# Patient Record
Sex: MALE | HISPANIC OR LATINO
[De-identification: names, ages, dates, MRNs, and addresses within clinical notes are randomized per-mention and may not be internally consistent; named-entity substitution may affect disease eponyms.]

---

## 2022-09-03 ENCOUNTER — APPOINTMENT (OUTPATIENT)
Dept: INTERNAL MEDICINE | Facility: CLINIC | Age: 57
End: 2022-09-03

## 2022-09-03 ENCOUNTER — NON-APPOINTMENT (OUTPATIENT)
Age: 57
End: 2022-09-03

## 2022-09-03 VITALS
OXYGEN SATURATION: 97 % | RESPIRATION RATE: 18 BRPM | SYSTOLIC BLOOD PRESSURE: 130 MMHG | HEIGHT: 69.69 IN | BODY MASS INDEX: 29.68 KG/M2 | DIASTOLIC BLOOD PRESSURE: 82 MMHG | HEART RATE: 87 BPM | WEIGHT: 205 LBS | TEMPERATURE: 97.8 F

## 2022-09-03 DIAGNOSIS — Z81.8 FAMILY HISTORY OF OTHER MENTAL AND BEHAVIORAL DISORDERS: ICD-10-CM

## 2022-09-03 PROCEDURE — G0444 DEPRESSION SCREEN ANNUAL: CPT

## 2022-09-03 PROCEDURE — 93000 ELECTROCARDIOGRAM COMPLETE: CPT | Mod: 59

## 2022-09-03 PROCEDURE — 99204 OFFICE O/P NEW MOD 45 MIN: CPT | Mod: 25

## 2022-09-03 PROCEDURE — 36415 COLL VENOUS BLD VENIPUNCTURE: CPT

## 2022-09-03 PROCEDURE — 99386 PREV VISIT NEW AGE 40-64: CPT | Mod: 25

## 2022-09-03 NOTE — HISTORY OF PRESENT ILLNESS
[de-identified] : Patient presents complaining of right groin discomfort for the last few months moderate intermittent only associated with lifting that he does at work.  Patient have noticed a bulge in the right side of the groin and umbilical area.  No nausea vomiting constipation diarrhea fever weight changes urinary symptoms

## 2022-09-03 NOTE — HEALTH RISK ASSESSMENT
[0] : 2) Feeling down, depressed, or hopeless: Not at all (0) [PHQ-2 Negative - No further assessment needed] : PHQ-2 Negative - No further assessment needed [[id number]] : 0

## 2022-09-03 NOTE — PHYSICAL EXAM
[No Acute Distress] : no acute distress [Well Nourished] : well nourished [Well Developed] : well developed [Well-Appearing] : well-appearing [Normal Sclera/Conjunctiva] : normal sclera/conjunctiva [PERRL] : pupils equal round and reactive to light [EOMI] : extraocular movements intact [Normal Outer Ear/Nose] : the outer ears and nose were normal in appearance [Normal Oropharynx] : the oropharynx was normal [No JVD] : no jugular venous distention [No Lymphadenopathy] : no lymphadenopathy [Supple] : supple [Thyroid Normal, No Nodules] : the thyroid was normal and there were no nodules present [No Respiratory Distress] : no respiratory distress  [No Accessory Muscle Use] : no accessory muscle use [Clear to Auscultation] : lungs were clear to auscultation bilaterally [Normal Rate] : normal rate  [Regular Rhythm] : with a regular rhythm [Normal S1, S2] : normal S1 and S2 [No Murmur] : no murmur heard [No Carotid Bruits] : no carotid bruits [No Abdominal Bruit] : a ~M bruit was not heard ~T in the abdomen [No Varicosities] : no varicosities [Pedal Pulses Present] : the pedal pulses are present [No Edema] : there was no peripheral edema [No Palpable Aorta] : no palpable aorta [No Extremity Clubbing/Cyanosis] : no extremity clubbing/cyanosis [Soft] : abdomen soft [Non Tender] : non-tender [Non-distended] : non-distended [No Masses] : no abdominal mass palpated [No HSM] : no HSM [Normal Bowel Sounds] : normal bowel sounds [Normal Posterior Cervical Nodes] : no posterior cervical lymphadenopathy [Normal Anterior Cervical Nodes] : no anterior cervical lymphadenopathy [No CVA Tenderness] : no CVA  tenderness [No Spinal Tenderness] : no spinal tenderness [No Joint Swelling] : no joint swelling [Grossly Normal Strength/Tone] : grossly normal strength/tone [No Rash] : no rash [Coordination Grossly Intact] : coordination grossly intact [No Focal Deficits] : no focal deficits [Normal Gait] : normal gait [Deep Tendon Reflexes (DTR)] : deep tendon reflexes were 2+ and symmetric [Normal Affect] : the affect was normal [Normal Insight/Judgement] : insight and judgment were intact [de-identified] : Bulge umbilical area [de-identified] : Fullness left scrotal area

## 2022-09-03 NOTE — ASSESSMENT
[FreeTextEntry1] : Annual physical routine preventative care and immunizations discussed with the patient healthy lifestyle diet and exercises PSA colonoscopy blood work checked\par Umbilical hernia asymptomatic we will obtain CT to assess extent of hernia.  Surgical referral pending CT\par Right inguinal hernia groin pain will obtain CT.  Surgical referral discussed with the patient check labs check urine\par Follow-up 3 weeks/pending results

## 2022-09-08 LAB
25(OH)D3 SERPL-MCNC: 26.4 NG/ML
ALBUMIN SERPL ELPH-MCNC: 5 G/DL
ALP BLD-CCNC: 70 U/L
ALT SERPL-CCNC: 48 U/L
ANION GAP SERPL CALC-SCNC: 17 MMOL/L
APPEARANCE: CLEAR
AST SERPL-CCNC: 35 U/L
BASOPHILS # BLD AUTO: 0.02 K/UL
BASOPHILS NFR BLD AUTO: 0.4 %
BILIRUB SERPL-MCNC: 0.7 MG/DL
BILIRUBIN URINE: NEGATIVE
BLOOD URINE: NEGATIVE
BUN SERPL-MCNC: 10 MG/DL
CALCIUM SERPL-MCNC: 9.7 MG/DL
CHLORIDE SERPL-SCNC: 102 MMOL/L
CHOLEST SERPL-MCNC: 226 MG/DL
CO2 SERPL-SCNC: 21 MMOL/L
COLOR: COLORLESS
CREAT SERPL-MCNC: 0.97 MG/DL
EGFR: 92 ML/MIN/1.73M2
EOSINOPHIL # BLD AUTO: 0.11 K/UL
EOSINOPHIL NFR BLD AUTO: 2 %
ESTIMATED AVERAGE GLUCOSE: 111 MG/DL
FOLATE SERPL-MCNC: 15.1 NG/ML
GLUCOSE QUALITATIVE U: NEGATIVE
GLUCOSE SERPL-MCNC: 97 MG/DL
HBA1C MFR BLD HPLC: 5.5 %
HCT VFR BLD CALC: 44.2 %
HDLC SERPL-MCNC: 36 MG/DL
HGB BLD-MCNC: 14.8 G/DL
IMM GRANULOCYTES NFR BLD AUTO: 0.2 %
KETONES URINE: NEGATIVE
LDLC SERPL CALC-MCNC: 147 MG/DL
LEUKOCYTE ESTERASE URINE: NEGATIVE
LYMPHOCYTES # BLD AUTO: 1.96 K/UL
LYMPHOCYTES NFR BLD AUTO: 35.6 %
MAN DIFF?: NORMAL
MCHC RBC-ENTMCNC: 29.9 PG
MCHC RBC-ENTMCNC: 33.5 GM/DL
MCV RBC AUTO: 89.3 FL
MONOCYTES # BLD AUTO: 0.38 K/UL
MONOCYTES NFR BLD AUTO: 6.9 %
NEUTROPHILS # BLD AUTO: 3.02 K/UL
NEUTROPHILS NFR BLD AUTO: 54.9 %
NITRITE URINE: NEGATIVE
NONHDLC SERPL-MCNC: 190 MG/DL
PH URINE: 7.5
PLATELET # BLD AUTO: 218 K/UL
POTASSIUM SERPL-SCNC: 4.4 MMOL/L
PROT SERPL-MCNC: 7.2 G/DL
PROTEIN URINE: NEGATIVE
PSA FREE FLD-MCNC: 26 %
PSA FREE SERPL-MCNC: 0.33 NG/ML
PSA SERPL-MCNC: 1.27 NG/ML
RBC # BLD: 4.95 M/UL
RBC # FLD: 14.6 %
SODIUM SERPL-SCNC: 140 MMOL/L
SPECIFIC GRAVITY URINE: 1.01
TRIGL SERPL-MCNC: 217 MG/DL
TSH SERPL-ACNC: 1.6 UIU/ML
UROBILINOGEN URINE: NORMAL
VIT B12 SERPL-MCNC: 687 PG/ML
WBC # FLD AUTO: 5.5 K/UL

## 2022-09-09 ENCOUNTER — NON-APPOINTMENT (OUTPATIENT)
Age: 57
End: 2022-09-09

## 2022-09-24 ENCOUNTER — APPOINTMENT (OUTPATIENT)
Dept: INTERNAL MEDICINE | Facility: CLINIC | Age: 57
End: 2022-09-24

## 2022-09-24 VITALS
HEART RATE: 73 BPM | DIASTOLIC BLOOD PRESSURE: 70 MMHG | RESPIRATION RATE: 17 BRPM | BODY MASS INDEX: 30.36 KG/M2 | SYSTOLIC BLOOD PRESSURE: 130 MMHG | HEIGHT: 69 IN | OXYGEN SATURATION: 99 % | TEMPERATURE: 97.5 F | WEIGHT: 205 LBS

## 2022-09-24 DIAGNOSIS — R10.30 LOWER ABDOMINAL PAIN, UNSPECIFIED: ICD-10-CM

## 2022-09-24 PROCEDURE — 99214 OFFICE O/P EST MOD 30 MIN: CPT | Mod: 25

## 2022-09-24 PROCEDURE — 99401 PREV MED CNSL INDIV APPRX 15: CPT

## 2022-09-24 NOTE — HISTORY OF PRESENT ILLNESS
[FreeTextEntry1] : Follow-up [de-identified] : Patient presents for follow-up labs\par Hyperlipidemia total cholesterol 226 triglycerides 217 HDL 36 \par ALT AST 48/35.  Patient denies nausea vomiting right upper quadrant abdominal pain changes in color of pain urine and stool\par Vitamin D deficiency 26\par

## 2022-09-24 NOTE — ASSESSMENT
[FreeTextEntry1] : Umbilical hernia CT scan pending surgical evaluation after CT patient stable\par Hyperlipidemia low-fat low-cholesterol diet discussed with the patient 10 minutes we will repeat fasting in 3 months\par Elevated LFTs mild we will monitor advised to avoid over-the-counter Tylenol NSAIDs alcohol.  Will review CT of the\par Vitamin D deficiency supplement\par Groin pain inguinal hernia right CT pending surgical evaluation at this\par Follow-up after CT/3-month\par

## 2022-09-24 NOTE — PHYSICAL EXAM
[No Respiratory Distress] : no respiratory distress  [No Edema] : there was no peripheral edema [Normal] : soft, non-tender, non-distended, no masses palpated, no HSM and normal bowel sounds [de-identified] : Umbilical hernia

## 2022-10-29 ENCOUNTER — APPOINTMENT (OUTPATIENT)
Dept: INTERNAL MEDICINE | Facility: CLINIC | Age: 57
End: 2022-10-29

## 2022-10-29 VITALS
OXYGEN SATURATION: 98 % | HEIGHT: 69 IN | RESPIRATION RATE: 18 BRPM | WEIGHT: 204 LBS | SYSTOLIC BLOOD PRESSURE: 118 MMHG | DIASTOLIC BLOOD PRESSURE: 82 MMHG | TEMPERATURE: 96.8 F | BODY MASS INDEX: 30.21 KG/M2 | HEART RATE: 84 BPM

## 2022-10-29 DIAGNOSIS — R79.89 OTHER SPECIFIED ABNORMAL FINDINGS OF BLOOD CHEMISTRY: ICD-10-CM

## 2022-10-29 PROCEDURE — 99214 OFFICE O/P EST MOD 30 MIN: CPT | Mod: 25

## 2022-10-29 PROCEDURE — 99401 PREV MED CNSL INDIV APPRX 15: CPT

## 2022-11-05 PROBLEM — R79.89 ELEVATED LFTS: Status: ACTIVE | Noted: 2022-09-24

## 2022-11-05 NOTE — HISTORY OF PRESENT ILLNESS
[FreeTextEntry1] : Follow-up results [de-identified] : Patient presents for follow-up CAT scan.  CT scan revealed fatty liver several renal lesions which appear to be cysts number of lesions may be complicated cysts.  2 small supraumbilical abdominal wall defects with associated adipose tissue there is a moderate to large right inguinal fat-containing hernia measuring 5.2 x 3.6 by at least 8 centimeters in length and there is small to moderate left inguinal hernia measuring 2.8 x 2.2 at least 10 cm in length.  Degenerative changes in L-spine noted

## 2022-11-05 NOTE — PHYSICAL EXAM
[Normal] : no acute distress, well nourished, well developed and well-appearing [No Respiratory Distress] : no respiratory distress  [No Edema] : there was no peripheral edema [de-identified] : Inguinal umbilical hernia is visualized no tenderness to palpation no signs of incarceration or strangulation

## 2022-11-05 NOTE — ASSESSMENT
[FreeTextEntry1] : Inguinal hernias bilaterally moderate to large will require surgical intervention arrangement to the surgeon has been made referral given to the patient.  Currently patient is stable\par Umbilical hernia moderate we will leave up to the discretion of the surgeon to decide on surgical intervention\par Elevated LFTs/fatty liver I discussed with the patient that the treatment for this condition is mostly weight loss.  The weight loss is needed to be 5 to 10% of current body weight.  We will continue monitoring LFTs.  I advised patient to discuss this issues with GI.  Discussed with the patient that this condition can progress to fibrosis and cirrhosis in the future and can lead to liver failure diet modifications discussed with the patient 15 minutes\par Follow-up 1 month after surgical evaluation

## 2022-12-24 ENCOUNTER — APPOINTMENT (OUTPATIENT)
Dept: INTERNAL MEDICINE | Facility: CLINIC | Age: 57
End: 2022-12-24

## 2023-01-14 ENCOUNTER — NON-APPOINTMENT (OUTPATIENT)
Age: 58
End: 2023-01-14

## 2023-01-14 ENCOUNTER — APPOINTMENT (OUTPATIENT)
Dept: INTERNAL MEDICINE | Facility: CLINIC | Age: 58
End: 2023-01-14
Payer: MEDICAID

## 2023-01-14 VITALS
SYSTOLIC BLOOD PRESSURE: 120 MMHG | HEART RATE: 95 BPM | HEIGHT: 69 IN | RESPIRATION RATE: 17 BRPM | DIASTOLIC BLOOD PRESSURE: 80 MMHG | TEMPERATURE: 97.5 F | BODY MASS INDEX: 30.21 KG/M2 | WEIGHT: 204 LBS | OXYGEN SATURATION: 98 %

## 2023-01-14 DIAGNOSIS — R94.31 ABNORMAL ELECTROCARDIOGRAM [ECG] [EKG]: ICD-10-CM

## 2023-01-14 DIAGNOSIS — Z01.818 ENCOUNTER FOR OTHER PREPROCEDURAL EXAMINATION: ICD-10-CM

## 2023-01-14 DIAGNOSIS — K40.90 UNILATERAL INGUINAL HERNIA, W/OUT OBSTRUCTION OR GANGRENE, NOT SPECIFIED AS RECURRENT: ICD-10-CM

## 2023-01-14 PROCEDURE — 93000 ELECTROCARDIOGRAM COMPLETE: CPT

## 2023-01-14 PROCEDURE — 99213 OFFICE O/P EST LOW 20 MIN: CPT | Mod: 25

## 2023-01-14 NOTE — ASSESSMENT
[FreeTextEntry1] : Preoperative clearance labs checked.  Chest x-ray pending abnormal EKG Will need cardiology clearance\par Abnormal EKG sinus rhythm 102 nonspecific ST changes in anterior leads we will get cardiac clearance\par Umbilical inguinal hernias awaiting repair\par Final clearance pending labs chest x-ray cardio evaluation

## 2023-01-14 NOTE — HISTORY OF PRESENT ILLNESS
[FreeTextEntry1] : Presurgical clearance [de-identified] : Patient presents for presurgical clearance for bilateral inguinal and umbilical hernia repair January 24.  Feeling well no acute complaints

## 2023-01-19 ENCOUNTER — APPOINTMENT (OUTPATIENT)
Dept: INTERNAL MEDICINE | Facility: CLINIC | Age: 58
End: 2023-01-19
Payer: MEDICAID

## 2023-01-19 ENCOUNTER — APPOINTMENT (OUTPATIENT)
Dept: PHYSICAL MEDICINE AND REHAB | Facility: CLINIC | Age: 58
End: 2023-01-19
Payer: MEDICAID

## 2023-01-19 VITALS
WEIGHT: 204 LBS | HEART RATE: 90 BPM | OXYGEN SATURATION: 100 % | SYSTOLIC BLOOD PRESSURE: 122 MMHG | HEIGHT: 69 IN | DIASTOLIC BLOOD PRESSURE: 74 MMHG | TEMPERATURE: 97.7 F | BODY MASS INDEX: 30.21 KG/M2 | RESPIRATION RATE: 18 BRPM

## 2023-01-19 DIAGNOSIS — M71.22 SYNOVIAL CYST OF POPLITEAL SPACE [BAKER], LEFT KNEE: ICD-10-CM

## 2023-01-19 LAB
ABO + RH PNL BLD: NORMAL
ALBUMIN SERPL ELPH-MCNC: 4.7 G/DL
ALP BLD-CCNC: 69 U/L
ALT SERPL-CCNC: 39 U/L
ANION GAP SERPL CALC-SCNC: 13 MMOL/L
APTT BLD: 29.7 SEC
AST SERPL-CCNC: 29 U/L
BASOPHILS # BLD AUTO: 0.03 K/UL
BASOPHILS NFR BLD AUTO: 0.5 %
BILIRUB SERPL-MCNC: 0.5 MG/DL
BUN SERPL-MCNC: 14 MG/DL
CALCIUM SERPL-MCNC: 9.6 MG/DL
CHLORIDE SERPL-SCNC: 107 MMOL/L
CO2 SERPL-SCNC: 24 MMOL/L
CREAT SERPL-MCNC: 0.97 MG/DL
EGFR: 92 ML/MIN/1.73M2
EOSINOPHIL # BLD AUTO: 0.15 K/UL
EOSINOPHIL NFR BLD AUTO: 2.3 %
GLUCOSE SERPL-MCNC: 122 MG/DL
HCT VFR BLD CALC: 41 %
HGB BLD-MCNC: 14.3 G/DL
IMM GRANULOCYTES NFR BLD AUTO: 0.2 %
INR PPP: 0.97 RATIO
LYMPHOCYTES # BLD AUTO: 1.93 K/UL
LYMPHOCYTES NFR BLD AUTO: 29.7 %
MAN DIFF?: NORMAL
MCHC RBC-ENTMCNC: 29.5 PG
MCHC RBC-ENTMCNC: 34.9 GM/DL
MCV RBC AUTO: 84.7 FL
MONOCYTES # BLD AUTO: 0.54 K/UL
MONOCYTES NFR BLD AUTO: 8.3 %
NEUTROPHILS # BLD AUTO: 3.83 K/UL
NEUTROPHILS NFR BLD AUTO: 59 %
PLATELET # BLD AUTO: 262 K/UL
POTASSIUM SERPL-SCNC: 3.9 MMOL/L
PROT SERPL-MCNC: 7.3 G/DL
PT BLD: 11.3 SEC
RBC # BLD: 4.84 M/UL
RBC # FLD: 13.5 %
SODIUM SERPL-SCNC: 143 MMOL/L
WBC # FLD AUTO: 6.49 K/UL

## 2023-01-19 PROCEDURE — 99204 OFFICE O/P NEW MOD 45 MIN: CPT

## 2023-01-19 PROCEDURE — 99211 OFF/OP EST MAY X REQ PHY/QHP: CPT

## 2023-01-19 RX ORDER — IBUPROFEN 600 MG/1
600 TABLET, FILM COATED ORAL 3 TIMES DAILY
Qty: 60 | Refills: 0 | Status: ACTIVE | COMMUNITY
Start: 2023-01-19 | End: 1900-01-01

## 2023-01-21 LAB — SARS-COV-2 N GENE NPH QL NAA+PROBE: NOT DETECTED

## 2023-01-23 PROBLEM — M71.22 SYNOVIAL CYST OF LEFT POPLITEAL SPACE: Status: ACTIVE | Noted: 2023-01-19

## 2023-01-23 NOTE — HISTORY OF PRESENT ILLNESS
[FreeTextEntry1] : 57 year old male presents with left knee pain worse in 1 months\par \par Pain:  8/10 Worse: 10/10 Quality: sharp  Frequency: constant\par The pain starts in the front of of the knee and also feels as if he has fullness behind the knee\par The left knee pain is worse with walking 3 blocks and use of stairs\par He alternates with OTC advil 1 po qd and otc tylenol with temporary relief of his pain\par

## 2023-01-23 NOTE — PHYSICAL EXAM
[FreeTextEntry1] : Pleasant, in no distress. Language: English\par HEENT: Head: no trauma. Eyes: no discharge. Ears: No discharge. Nose No discharge. Throat: clear\par Neck: FAROM. Negative Spurlings\par Heart: RR, +S1, S2\par Lungs: CTA\par Abdomen: soft, NT\par Lumbar spine: FAROM, no spasm\par \par LUE: Shoulder:FAROM, MS 5/5\par Elbow: FAROM, MS 5/5 reflexes 2/4\par Wrist: FAROM, MS 5/5 reflexes 2/4\par Warm, nontender, pulse 2+\par \par RUE:Shoulder:FAROM, MS 5/5\par Elbow: FAROM, MS 5/5 reflexes 2/4\par Wrist: FAROM, MS 5/5 reflexes 2/4\par Warm, nontender, pulse 2+\par \par LLE: Hip: FAROM, MS 5/5\par Knee: AROM, Fullness of the popliteal fossa. + grind  MS 4/5 reflexes 2/4\par Ankle: FAROM, MS 5/5 reflexes 2/4\par Warm , nontender, pulse 2+ negative homans\par \par RLE: Hip: FAROM, MS 5/5\par Knee: FAROM, MS 5/5 reflexes 2/4\par Ankle: FAROM, MS 5/5 reflexes 2/4\par Warm , nontender, pulse 2+ negative homans\par \par Gait: Spontaneous, reciprocal, safe without an assistive device\par \par Sensation\par RUE: sensation is intact to light touch, pinprick  and proprioception\par LUE: sensation is intact to light touch, pinprick  and proprioception\par RLE: sensation is intact to light touch, pinprick  and proprioception. Neg SLR. Neg NATASHA, Neg FADIR\par LLE: sensation is intact to light touch, pinprick  and proprioception. Neg SLR. Neg NATASHA, Neg FADIR\par \par

## 2023-02-13 ENCOUNTER — NON-APPOINTMENT (OUTPATIENT)
Age: 58
End: 2023-02-13

## 2023-04-11 ENCOUNTER — APPOINTMENT (OUTPATIENT)
Dept: PHYSICAL MEDICINE AND REHAB | Facility: CLINIC | Age: 58
End: 2023-04-11
Payer: MEDICAID

## 2023-04-11 VITALS
OXYGEN SATURATION: 98 % | HEART RATE: 105 BPM | TEMPERATURE: 97.5 F | WEIGHT: 204 LBS | RESPIRATION RATE: 17 BRPM | HEIGHT: 69 IN | BODY MASS INDEX: 30.21 KG/M2 | DIASTOLIC BLOOD PRESSURE: 90 MMHG | SYSTOLIC BLOOD PRESSURE: 120 MMHG

## 2023-04-11 PROCEDURE — 99213 OFFICE O/P EST LOW 20 MIN: CPT

## 2023-04-13 ENCOUNTER — APPOINTMENT (OUTPATIENT)
Dept: INTERNAL MEDICINE | Facility: CLINIC | Age: 58
End: 2023-04-13
Payer: MEDICAID

## 2023-04-13 VITALS
HEIGHT: 69 IN | RESPIRATION RATE: 17 BRPM | WEIGHT: 204 LBS | SYSTOLIC BLOOD PRESSURE: 112 MMHG | HEART RATE: 95 BPM | BODY MASS INDEX: 30.21 KG/M2 | TEMPERATURE: 97.5 F | DIASTOLIC BLOOD PRESSURE: 80 MMHG | OXYGEN SATURATION: 97 %

## 2023-04-13 DIAGNOSIS — K42.9 UMBILICAL HERNIA W/OUT OBSTRUCTION OR GANGRENE: ICD-10-CM

## 2023-04-13 DIAGNOSIS — K40.20 BILATERAL INGUINAL HERNIA, W/OUT OBSTRUCTION OR GANGRENE, NOT SPECIFIED AS RECURRENT: ICD-10-CM

## 2023-04-13 PROCEDURE — 99213 OFFICE O/P EST LOW 20 MIN: CPT

## 2023-04-15 ENCOUNTER — APPOINTMENT (OUTPATIENT)
Dept: INTERNAL MEDICINE | Facility: CLINIC | Age: 58
End: 2023-04-15
Payer: MEDICAID

## 2023-04-15 VITALS
DIASTOLIC BLOOD PRESSURE: 80 MMHG | HEART RATE: 85 BPM | TEMPERATURE: 97.5 F | RESPIRATION RATE: 17 BRPM | SYSTOLIC BLOOD PRESSURE: 110 MMHG | WEIGHT: 204 LBS | HEIGHT: 69 IN | OXYGEN SATURATION: 98 % | BODY MASS INDEX: 30.21 KG/M2

## 2023-04-15 PROCEDURE — 36415 COLL VENOUS BLD VENIPUNCTURE: CPT

## 2023-04-16 NOTE — PHYSICAL EXAM
[FreeTextEntry1] : Pleasant, in no distress. Language: English\par HEENT: Head: no trauma. Eyes: no discharge. Ears: No discharge. Nose No discharge. Throat: clear\par Neck: FAROM. Negative Spurlings\par Heart: RR, +S1, S2\par Lungs: CTA\par Abdomen: soft, NT\par Lumbar spine: FAROM, no spasm\par \par LUE: Shoulder:FAROM, MS 5/5\par Elbow: FAROM, MS 5/5 reflexes 2/4\par Wrist: FAROM, MS 5/5 reflexes 2/4\par Warm, nontender, pulse 2+\par \par RUE:Shoulder:FAROM, MS 5/5\par Elbow: FAROM, MS 5/5 reflexes 2/4\par Wrist: FAROM, MS 5/5 reflexes 2/4\par Warm, nontender, pulse 2+\par \par LLE: Hip: FAROM, MS 5/5\par Knee: AROM, Fullness of the popliteal fossa. + grind  MS 4/5 reflexes 2/4\par Ankle: FAROM, MS 5/5 reflexes 2/4\par Warm , nontender, pulse 2+ negative homans\par \par RLE: Hip: FAROM, MS 5/5\par Knee: FAROM, MS 5-/5 lateral tracking of the patella reflexes 2/4\par Ankle: FAROM, MS 5/5 reflexes 2/4\par Warm , nontender, pulse 2+ negative homans\par \par Gait: Spontaneous, reciprocal, safe without an assistive device\par \par Sensation\par RUE: sensation is intact to light touch, pinprick  and proprioception\par LUE: sensation is intact to light touch, pinprick  and proprioception\par RLE: sensation is intact to light touch, pinprick  and proprioception. Neg SLR. Neg NATASHA, Neg FADIR\par LLE: sensation is intact to light touch, pinprick  and proprioception. Neg SLR. Neg NATASHA, Neg FADIR\par \par

## 2023-04-16 NOTE — DATA REVIEWED
[Plain X-Rays] : plain X-Rays [FreeTextEntry1] : X-rays of his knees performed on April 6, 2023 revealed normal

## 2023-04-16 NOTE — HISTORY OF PRESENT ILLNESS
[FreeTextEntry1] : The patient was last seen in the office in January.  He attended restorative physical therapy his knee pain improved.  He has been doing well until recently.\par \par 57 year old male presents with left knee pain his knee pain has been aggravated over the last 8 days.  Worse for \par He has been walking up and down stairs\par \par Pain:  8/10 Worse: 10/10 Quality: burning  Frequency: constant\par The pain starts in the front of of the knee and also feels as if he has fullness behind the knee\par The left knee pain is worse with walking 1 blocks and use of stairs\par He has been using diclofenac 100 mg with very little relief of his pain\par

## 2023-04-20 LAB
ALBUMIN SERPL ELPH-MCNC: 4.8 G/DL
ALP BLD-CCNC: 63 U/L
ALT SERPL-CCNC: 45 U/L
ANION GAP SERPL CALC-SCNC: 17 MMOL/L
AST SERPL-CCNC: 30 U/L
BILIRUB SERPL-MCNC: 0.6 MG/DL
BUN SERPL-MCNC: 18 MG/DL
CALCIUM SERPL-MCNC: 9.5 MG/DL
CHLORIDE SERPL-SCNC: 104 MMOL/L
CHOLEST SERPL-MCNC: 218 MG/DL
CO2 SERPL-SCNC: 20 MMOL/L
CREAT SERPL-MCNC: 0.94 MG/DL
EGFR: 96 ML/MIN/1.73M2
GLUCOSE SERPL-MCNC: 98 MG/DL
HDLC SERPL-MCNC: 37 MG/DL
LDLC SERPL CALC-MCNC: 136 MG/DL
NONHDLC SERPL-MCNC: 181 MG/DL
POTASSIUM SERPL-SCNC: 4.3 MMOL/L
PROT SERPL-MCNC: 7 G/DL
SODIUM SERPL-SCNC: 140 MMOL/L
TRIGL SERPL-MCNC: 225 MG/DL

## 2023-05-02 ENCOUNTER — RX RENEWAL (OUTPATIENT)
Age: 58
End: 2023-05-02

## 2023-05-02 RX ORDER — IBUPROFEN 600 MG/1
600 TABLET, FILM COATED ORAL 3 TIMES DAILY
Qty: 90 | Refills: 0 | Status: ACTIVE | COMMUNITY
Start: 2023-04-11 | End: 1900-01-01

## 2023-05-11 ENCOUNTER — NON-APPOINTMENT (OUTPATIENT)
Age: 58
End: 2023-05-11

## 2023-05-18 ENCOUNTER — APPOINTMENT (OUTPATIENT)
Dept: PHYSICAL MEDICINE AND REHAB | Facility: CLINIC | Age: 58
End: 2023-05-18
Payer: MEDICAID

## 2023-05-18 VITALS
BODY MASS INDEX: 30.21 KG/M2 | HEART RATE: 87 BPM | OXYGEN SATURATION: 98 % | WEIGHT: 204 LBS | SYSTOLIC BLOOD PRESSURE: 118 MMHG | RESPIRATION RATE: 17 BRPM | DIASTOLIC BLOOD PRESSURE: 78 MMHG | TEMPERATURE: 97.2 F | HEIGHT: 69 IN

## 2023-05-18 PROCEDURE — 99214 OFFICE O/P EST MOD 30 MIN: CPT | Mod: 25

## 2023-05-18 PROCEDURE — 20610 DRAIN/INJ JOINT/BURSA W/O US: CPT | Mod: LT

## 2023-05-20 PROBLEM — K40.20 INGUINAL HERNIA, BILATERAL: Status: ACTIVE | Noted: 2022-10-29

## 2023-05-20 PROBLEM — K42.9 UMBILICAL HERNIA: Status: ACTIVE | Noted: 2022-09-03

## 2023-05-20 NOTE — HISTORY OF PRESENT ILLNESS
[FreeTextEntry1] : Follow-up [de-identified] : Patient presents for follow-up inguinal bilateral and umbilical hernia repair doing well no postop complications\par Follow-up hyperlipidemia fatty liver renal cysts.  Generally feels well no acute complaints

## 2023-05-20 NOTE — ASSESSMENT
[FreeTextEntry1] : Renal cyst will obtain ultrasound for further characterization.  Urology referral pending results\par Fatty liver weight loss discussed with the patient at least 10% of current body weight.  Discussed with the patient possibility of progression to fibrosis and cirrhosis in the future\par Hyperlipidemia check fasting lipids will start statin pending results\par Umbilical /inguinal hernia status postrepair doing well\par Follow-up pending results

## 2023-05-22 ENCOUNTER — RX CHANGE (OUTPATIENT)
Age: 58
End: 2023-05-22

## 2023-05-22 RX ORDER — TRIAMCINOLONE ACETONIDE 40 MG/ML
40 SUSPENSION INTRA-ARTERIAL; INTRAMUSCULAR
Qty: 1 | Refills: 0 | Status: COMPLETED | OUTPATIENT
Start: 2023-05-22

## 2023-05-22 RX ORDER — LIDOCAINE HYDROCHLORIDE 10 MG/ML
1 INJECTION, SOLUTION INFILTRATION; PERINEURAL
Refills: 0 | Status: COMPLETED | OUTPATIENT
Start: 2023-05-22

## 2023-05-22 RX ORDER — DICLOFENAC SODIUM 1% 10 MG/G
1 GEL TOPICAL
Qty: 100 | Refills: 3 | Status: DISCONTINUED | COMMUNITY
Start: 2023-05-18 | End: 2023-05-22

## 2023-05-22 RX ADMIN — LIDOCAINE HYDROCHLORIDE 1 %: 10 INJECTION, SOLUTION INFILTRATION; PERINEURAL at 00:00

## 2023-05-22 RX ADMIN — TRIAMCINOLONE ACETONIDE 0 MG/ML: 40 SUSPENSION INTRA-ARTERIAL; INTRAMUSCULAR at 00:00

## 2023-05-22 NOTE — HISTORY OF PRESENT ILLNESS
[FreeTextEntry1] : The patient was last seen in the office in January.  He attended restorative physical therapy his knee pain improved.  He has been doing well until recently.\par \par 57 year old male presents with left knee pain his knee pain has been aggravated over the last 8 days.  Worse for \par He has been walking up and down stairs\par He denies a fall\par \par Pain:  8/10 Worse: 10/10 Quality: burning  Frequency: constant\par The pain starts in the front of of the knee and also feels as if he has fullness behind the knee\par The left knee pain is worse with walking 1 blocks and use of stairs\par He has been using diclofenac 100 mg with very little relief of his pain\par

## 2023-06-13 ENCOUNTER — APPOINTMENT (OUTPATIENT)
Dept: PHYSICAL MEDICINE AND REHAB | Facility: CLINIC | Age: 58
End: 2023-06-13
Payer: MEDICAID

## 2023-06-13 VITALS
SYSTOLIC BLOOD PRESSURE: 124 MMHG | HEIGHT: 69 IN | BODY MASS INDEX: 30.51 KG/M2 | RESPIRATION RATE: 17 BRPM | WEIGHT: 206 LBS | HEART RATE: 88 BPM | TEMPERATURE: 97.6 F | OXYGEN SATURATION: 98 % | DIASTOLIC BLOOD PRESSURE: 80 MMHG

## 2023-06-13 DIAGNOSIS — Z02.6 ENCOUNTER FOR EXAMINATION FOR INSURANCE PURPOSES: ICD-10-CM

## 2023-06-13 PROCEDURE — 99214 OFFICE O/P EST MOD 30 MIN: CPT

## 2023-06-13 NOTE — HISTORY OF PRESENT ILLNESS
[FreeTextEntry1] : \par \par 57 year old male presents with left knee pain \par The steroid injection to the left knee and lasted for 3 weeks.  His pain started to recur over the last several days.  He has been attending restorative therapies at [facility] for 2 days in the last month.  [facility] has closed for renovations until recently.\par \par Pain:  8/10 Worse: 10/10 Quality: burning  Frequency: constant\par The pain starts in the front of of the knee and also feels as if he has fullness behind the knee\par The pain is aggravated with standing after period of sit.\par The left knee pain is worse with walking 1 blocks and use of stairs\par Ibuprofen 600 mg does not work to decrease his pain at this time\par \par

## 2023-06-13 NOTE — PHYSICAL EXAM
[FreeTextEntry1] : Pleasant, in no distress. Language: English\par HEENT: Head: no trauma. Eyes: no discharge. Ears: No discharge. Nose No discharge. Throat: clear\par Neck: FAROM. Negative Spurlings\par Heart: RR, +S1, S2\par Lungs: CTA\par Abdomen: soft, NT\par Lumbar spine: FAROM, no spasm\par \par LUE: Shoulder:FAROM, MS 5/5\par Elbow: FAROM, MS 5/5 reflexes 2/4\par Wrist: FAROM, MS 5/5 reflexes 2/4\par Warm, nontender, pulse 2+\par \par RUE:Shoulder:FAROM, MS 5/5\par Elbow: FAROM, MS 5/5 reflexes 2/4\par Wrist: FAROM, MS 5/5 reflexes 2/4\par Warm, nontender, pulse 2+\par \par LLE: Hip: FAROM, MS 5/5\par Knee: AROM, Fullness of the popliteal fossa. + grind  MS 4/5 reflexes 2/4 positive medial Ck's.  No instability.\par Ankle: FAROM, MS 5/5 reflexes 2/4\par Warm , nontender, pulse 2+ negative homans\par \par RLE: Hip: FAROM, MS 5/5\par Knee: FAROM, MS 5-/5 lateral tracking of the patella reflexes 2/4\par Ankle: FAROM, MS 5/5 reflexes 2/4\par Warm , nontender, pulse 2+ negative homans\par \par Gait: Spontaneous, reciprocal, safe without an assistive device\par \par Sensation\par RUE: sensation is intact to light touch, pinprick  and proprioception\par LUE: sensation is intact to light touch, pinprick  and proprioception\par RLE: sensation is intact to light touch, pinprick  and proprioception. Neg SLR. Neg NATASHA, Neg FADIR\par LLE: sensation is intact to light touch, pinprick  and proprioception. Neg SLR. Neg NATASHA, Neg FADIR\par \par

## 2023-07-05 ENCOUNTER — RX RENEWAL (OUTPATIENT)
Age: 58
End: 2023-07-05

## 2023-07-06 ENCOUNTER — NON-APPOINTMENT (OUTPATIENT)
Age: 58
End: 2023-07-06

## 2023-07-11 RX ORDER — DICLOFENAC SODIUM 1% 10 MG/G
1 GEL TOPICAL
Qty: 100 | Refills: 3 | Status: ACTIVE | COMMUNITY
Start: 2023-05-22 | End: 1900-01-01

## 2023-08-08 ENCOUNTER — APPOINTMENT (OUTPATIENT)
Dept: PHYSICAL MEDICINE AND REHAB | Facility: CLINIC | Age: 58
End: 2023-08-08
Payer: MEDICAID

## 2023-08-08 VITALS
HEIGHT: 69 IN | HEART RATE: 86 BPM | TEMPERATURE: 97.7 F | SYSTOLIC BLOOD PRESSURE: 122 MMHG | WEIGHT: 205 LBS | OXYGEN SATURATION: 100 % | DIASTOLIC BLOOD PRESSURE: 80 MMHG | BODY MASS INDEX: 30.36 KG/M2 | RESPIRATION RATE: 17 BRPM

## 2023-08-08 PROCEDURE — 99213 OFFICE O/P EST LOW 20 MIN: CPT

## 2023-08-08 RX ORDER — NABUMETONE 500 MG/1
500 TABLET, FILM COATED ORAL
Qty: 60 | Refills: 0 | Status: ACTIVE | COMMUNITY
Start: 2023-08-08 | End: 1900-01-01

## 2023-08-10 NOTE — DATA REVIEWED
[Plain X-Rays] : plain X-Rays [FreeTextEntry1] : X-rays of his knees performed on April 6, 2023 revealed normal  MRI of the left knee 07.01.2023  Horizontal degenerative tear with free margin and superior surface erosion of posterior horn and free margin fraying of the body of the medial meniscus Large irregular chondral lesion of the trochlea with a subadjacent bone marrow signal alteration that is greater in the medial aspect.  Adjacent to this region junction with anterior lateral condyle bone marrow edema may reflect recent bone contusion. Medial compartment chondromalacia with high-grade chondral defect of the posterior weightbearing medial condyle Moderate joint effusion Mild Pez anserine bursitis

## 2023-08-10 NOTE — HISTORY OF PRESENT ILLNESS
[FreeTextEntry1] : 57 year old male presents with left knee pain  The steroid injection to the left knee and lasted for 3 weeks.   He has been to therapy over the last month. He has only been to therapy for 8 sessions. 10 sessions total. Therapy did not reduce his knee pain. He does not feel that therapy is helping to decrease his pain.  The injection to his knee also did not reduce his pain.  Pain:  8/10 Worse: 10/10 Quality: burning  Frequency: constant The pain starts in the front of of the knee and also feels as if he has fullness behind the knee The pain is aggravated with standing after period of sit. The left knee pain is worse with walking 1 blocks and use of stairs Diclofenac 50 mg did not reduce his pain

## 2023-08-24 ENCOUNTER — APPOINTMENT (OUTPATIENT)
Dept: ORTHOPEDIC SURGERY | Facility: CLINIC | Age: 58
End: 2023-08-24
Payer: MEDICAID

## 2023-08-24 VITALS — HEIGHT: 69 IN | WEIGHT: 205 LBS | BODY MASS INDEX: 30.36 KG/M2

## 2023-08-24 PROCEDURE — 99204 OFFICE O/P NEW MOD 45 MIN: CPT | Mod: 25

## 2023-08-24 PROCEDURE — 20610 DRAIN/INJ JOINT/BURSA W/O US: CPT | Mod: LT

## 2023-08-24 NOTE — HISTORY OF PRESENT ILLNESS
[8] : 8 [Dull/Aching] : dull/aching [Sharp] : sharp [Frequent] : frequent [Leisure] : leisure [Rest] : rest [Walking] : walking [de-identified] : 08/24/2023 Mr. TYLER GOLDSTEIN is a 58-year male that comes in today with a chief complaint of David knee.  + injection of the left knee. CSI 3 months prior.  + diclofenac  [] : Post Surgical Visit: no [FreeTextEntry1] : bilateral knees  [de-identified] : xray/mri

## 2023-08-24 NOTE — IMAGING
[de-identified] : Constitutional: well developed and well nourished, able to communicate Cardiovascular: Peripheral vascular exam is grossly normal Neurologic: Alert and oriented, no acute distress. Skin: normal skin with no ulcers, rashes, or lesions Pulmonary: No respiratory distress, breathing comfortably on room air Lymphatics: No obvious lymphadenopathy or lymphedema in areas examined  LEFT KNEE EXAM Alignment: Neutral  Effusion: None Atrophy: None                                                  Stable to Varus/valgus stress Posterior Drawer Test: negative Anterior Drawer Test: Negative Knee Extension/Flexion: 0 / 120  Medial/lateral compartments Medial joint line: POS Tenderness Lateral joint line: No Tenderness Ck test: negative  Patellofemoral joint Medial patellar facet: no tenderness Patellar grind: Negative  Tendons: Pes Anserine: No tenderness Gerdys Tubercle/ IT Band: No tenderness Quadriceps Tendon: No Tenderness patellar tendon: no Tenderness Tibial tubercle: not tenderness Calf: no Tenderness  Neurovascular exam Muscle strength: 5/5 Sensation to light touch: intact Distal pulses: 2+  IMAGING:  Xrays of the Left Knee were taken demonstrating  MRI fissuring medial surface patella - (ZWANGER)  chodnral defect trochlea high grade defect medial femoral condyle medial mensicus intact

## 2023-08-30 ENCOUNTER — RX RENEWAL (OUTPATIENT)
Age: 58
End: 2023-08-30

## 2023-08-30 RX ORDER — DICLOFENAC SODIUM 50 MG/1
50 TABLET, DELAYED RELEASE ORAL
Qty: 60 | Refills: 0 | Status: ACTIVE | COMMUNITY
Start: 2023-06-13 | End: 1900-01-01

## 2023-09-14 ENCOUNTER — APPOINTMENT (OUTPATIENT)
Dept: PHYSICAL MEDICINE AND REHAB | Facility: CLINIC | Age: 58
End: 2023-09-14

## 2023-11-09 ENCOUNTER — APPOINTMENT (OUTPATIENT)
Dept: ORTHOPEDIC SURGERY | Facility: CLINIC | Age: 58
End: 2023-11-09
Payer: MEDICAID

## 2023-11-09 VITALS — WEIGHT: 205 LBS | HEIGHT: 69 IN | BODY MASS INDEX: 30.36 KG/M2

## 2023-11-09 PROCEDURE — 20610 DRAIN/INJ JOINT/BURSA W/O US: CPT | Mod: LT

## 2023-11-09 PROCEDURE — 99213 OFFICE O/P EST LOW 20 MIN: CPT | Mod: 25

## 2023-12-13 ENCOUNTER — RX RENEWAL (OUTPATIENT)
Age: 58
End: 2023-12-13

## 2023-12-14 RX ORDER — ATORVASTATIN CALCIUM 10 MG/1
10 TABLET, FILM COATED ORAL DAILY
Qty: 30 | Refills: 5 | Status: ACTIVE | COMMUNITY
Start: 2023-04-20 | End: 1900-01-01

## 2023-12-21 ENCOUNTER — APPOINTMENT (OUTPATIENT)
Dept: ORTHOPEDIC SURGERY | Facility: CLINIC | Age: 58
End: 2023-12-21
Payer: MEDICAID

## 2023-12-21 VITALS — WEIGHT: 205 LBS | HEIGHT: 69 IN | BODY MASS INDEX: 30.36 KG/M2

## 2023-12-21 PROCEDURE — 20610 DRAIN/INJ JOINT/BURSA W/O US: CPT | Mod: LT

## 2023-12-21 PROCEDURE — 99214 OFFICE O/P EST MOD 30 MIN: CPT | Mod: 25

## 2023-12-21 RX ORDER — MELOXICAM 15 MG/1
15 TABLET ORAL
Qty: 30 | Refills: 2 | Status: ACTIVE | COMMUNITY
Start: 2023-12-21 | End: 1900-01-01

## 2023-12-21 NOTE — HISTORY OF PRESENT ILLNESS
[8] : 8 [Dull/Aching] : dull/aching [Sharp] : sharp [Frequent] : frequent [Leisure] : leisure [Rest] : rest [Walking] : walking [de-identified] : 08/24/2023 Mr. TYLER GOLDSTEIN is a 58-year male that comes in today with a chief complaint of David knee.  + injection of the left knee. CSI 3 months prior.  + diclofenac   11/9/23: Here for follow up. States 11 weeks of relief with CSI  12/21/23: patient still endorses swelling of the knee. Pain 3-4/10 [] : Post Surgical Visit: no [FreeTextEntry1] : bilateral knees  [de-identified] : xray/mri

## 2023-12-21 NOTE — ASSESSMENT
[FreeTextEntry1] : 58 year M with left knee OA - MRI - cesia CSI of the left knee today.   23: CSI L knee Follow up three months  2023 continues to have swelling of the knee, pain is improved educated on elevation and compression 6 week follow up for repeat CSI  Time Based billin minutes was spent with the patient today taking the patient's history, conducting a physical examination, reviewing imaging studies, and  detailed discussion regarding the diagnosis and treatment plan.

## 2023-12-21 NOTE — IMAGING
[de-identified] : Constitutional: well developed and well nourished, able to communicate Cardiovascular: Peripheral vascular exam is grossly normal Neurologic: Alert and oriented, no acute distress. Skin: normal skin with no ulcers, rashes, or lesions Pulmonary: No respiratory distress, breathing comfortably on room air Lymphatics: No obvious lymphadenopathy or lymphedema in areas examined  LEFT KNEE EXAM Alignment: Neutral  Effusion: mild Atrophy: None                                                  Stable to Varus/valgus stress Posterior Drawer Test: negative Anterior Drawer Test: Negative Knee Extension/Flexion: 0 / 120  Medial/lateral compartments Medial joint line: POS Tenderness Lateral joint line: No Tenderness Ck test: negative  Patellofemoral joint Medial patellar facet: no tenderness Patellar grind: Negative  Tendons: Pes Anserine: No tenderness Gerdys Tubercle/ IT Band: No tenderness Quadriceps Tendon: No Tenderness patellar tendon: no Tenderness Tibial tubercle: not tenderness Calf: no Tenderness  Neurovascular exam Muscle strength: 5/5 Sensation to light touch: intact Distal pulses: 2+  IMAGING:  Xrays of the Left Knee were taken demonstrating  MRI fissuring medial surface patella - (ZWANGER)  chodnral defect trochlea high grade defect medial femoral condyle medial mensicus intact

## 2023-12-21 NOTE — PROCEDURE
[FreeTextEntry3] : The risks, benefits and alternatives to the injection were discussed with the patient. The decision was made to proceed with the injection to reduce inflammation within the area. Verbal consent was obtained for the procedure. The left knee was cleaned with alcohol and ethyl chloride was sprayed topically. 1cc of 40mg Kenalog and 4cc of 1% lidocaine was injected. The patient tolerated the procedure well and was instructed to ice the affected joint as need+-ed later today. Activities can be performed as tolerated  
No

## 2024-01-13 ENCOUNTER — NON-APPOINTMENT (OUTPATIENT)
Age: 59
End: 2024-01-13

## 2024-01-13 ENCOUNTER — LABORATORY RESULT (OUTPATIENT)
Age: 59
End: 2024-01-13

## 2024-01-13 ENCOUNTER — APPOINTMENT (OUTPATIENT)
Dept: INTERNAL MEDICINE | Facility: CLINIC | Age: 59
End: 2024-01-13
Payer: MEDICAID

## 2024-01-13 VITALS
HEART RATE: 94 BPM | SYSTOLIC BLOOD PRESSURE: 130 MMHG | HEIGHT: 69 IN | WEIGHT: 208 LBS | RESPIRATION RATE: 17 BRPM | TEMPERATURE: 97.8 F | OXYGEN SATURATION: 97 % | BODY MASS INDEX: 30.81 KG/M2 | DIASTOLIC BLOOD PRESSURE: 80 MMHG

## 2024-01-13 DIAGNOSIS — E78.5 HYPERLIPIDEMIA, UNSPECIFIED: ICD-10-CM

## 2024-01-13 DIAGNOSIS — Z00.00 ENCOUNTER FOR GENERAL ADULT MEDICAL EXAMINATION W/OUT ABNORMAL FINDINGS: ICD-10-CM

## 2024-01-13 DIAGNOSIS — E55.9 VITAMIN D DEFICIENCY, UNSPECIFIED: ICD-10-CM

## 2024-01-13 DIAGNOSIS — N28.1 CYST OF KIDNEY, ACQUIRED: ICD-10-CM

## 2024-01-13 DIAGNOSIS — F52.4 PREMATURE EJACULATION: ICD-10-CM

## 2024-01-13 DIAGNOSIS — K76.0 FATTY (CHANGE OF) LIVER, NOT ELSEWHERE CLASSIFIED: ICD-10-CM

## 2024-01-13 PROCEDURE — 93000 ELECTROCARDIOGRAM COMPLETE: CPT

## 2024-01-13 PROCEDURE — 99396 PREV VISIT EST AGE 40-64: CPT | Mod: 25

## 2024-01-13 PROCEDURE — 99214 OFFICE O/P EST MOD 30 MIN: CPT | Mod: 25

## 2024-01-13 PROCEDURE — 36415 COLL VENOUS BLD VENIPUNCTURE: CPT

## 2024-01-13 NOTE — HISTORY OF PRESENT ILLNESS
[de-identified] : Patient presents for annual physical follow-up hyperlipidemia fatty liver obesity vitamin D deficiency.  Patient is doing well after hernia surgery last year.  Patient is complaining of longstanding history of premature ejaculation and wants to see a urologist for this problem

## 2024-01-13 NOTE — ASSESSMENT
[FreeTextEntry1] : Annual physical routine preventative care and immunizations discussed with the patient healthy lifestyle diet and exercises PSA colonoscopy blood work checked EKG discussed with the patient Premature ejaculation longstanding problem will refer to urology Vitamin D deficiency check labs supplement Fatty liver weight loss discussed with the patient placed 10% of current body weight progression to fibrosis and cirrhosis discussed with the patient check liver fibrosis panel Hyperlipidemia check fasting lipids.  Intervention pending results continue statin Renal cyst benign simple no follow-up needed  Obesity was discussed with the patient

## 2024-01-18 LAB
25(OH)D3 SERPL-MCNC: 18.6 NG/ML
ALBUMIN SERPL ELPH-MCNC: 4.9 G/DL
ALP BLD-CCNC: 67 U/L
ALT SERPL-CCNC: 32 U/L
ANION GAP SERPL CALC-SCNC: 15 MMOL/L
APPEARANCE: CLEAR
AST SERPL W P-5'-P-CCNC: 26 IU/L
AST SERPL-CCNC: 23 U/L
BASOPHILS # BLD AUTO: 0.05 K/UL
BASOPHILS NFR BLD AUTO: 0.8 %
BILIRUB SERPL-MCNC: 0.7 MG/DL
BILIRUBIN URINE: NEGATIVE
BLOOD URINE: NEGATIVE
BUN SERPL-MCNC: 16 MG/DL
CALCIUM SERPL-MCNC: 9.5 MG/DL
CHLORIDE SERPL-SCNC: 106 MMOL/L
CHOLEST SERPL-MCNC: 158 MG/DL
CHOLEST SERPL-MCNC: 161 MG/DL
CO2 SERPL-SCNC: 20 MMOL/L
COLOR: NORMAL
COMMENT:: NORMAL
CREAT SERPL-MCNC: 0.99 MG/DL
EGFR: 89 ML/MIN/1.73M2
EOSINOPHIL # BLD AUTO: 0.09 K/UL
EOSINOPHIL NFR BLD AUTO: 1.5 %
ESTIMATED AVERAGE GLUCOSE: 111 MG/DL
FIBROSIS STAGE SERPL QL: NORMAL
FIBROSURE ALPHA 2 MACROGLOBULINS: 89 MG/DL
FIBROSURE ALT (SGPT): 36 IU/L
FIBROSURE APOLIPOPROTEIN A1: 134 MG/DL
FIBROSURE GGT: 32 IU/L
FIBROSURE HAPTOGLOBIN: 124 MG/DL
FIBROSURE SCORING: NORMAL
FIBROSURE TOTAL BILIRUBIN: 0.6 MG/DL
FOLATE SERPL-MCNC: 17.9 NG/ML
GLUCOSE QUALITATIVE U: NEGATIVE MG/DL
GLUCOSE SERPL-MCNC: 102 MG/DL
GLUCOSE SERPL-MCNC: 96 MG/DL
HBA1C MFR BLD HPLC: 5.5 %
HCT VFR BLD CALC: 42.8 %
HDLC SERPL-MCNC: 47 MG/DL
HGB BLD-MCNC: 14.5 G/DL
IMM GRANULOCYTES NFR BLD AUTO: 0.3 %
INTERPRETATIONS:: NORMAL
KETONES URINE: NEGATIVE MG/DL
LDLC SERPL CALC-MCNC: 90 MG/DL
LEUKOCYTE ESTERASE URINE: NEGATIVE
LIVER FIBR SCORE SERPL CALC.FIBROSURE: 0.09
LYMPHOCYTES # BLD AUTO: 1.97 K/UL
LYMPHOCYTES NFR BLD AUTO: 32.7 %
Lab: NORMAL
MAN DIFF?: NORMAL
MCHC RBC-ENTMCNC: 28.8 PG
MCHC RBC-ENTMCNC: 33.9 GM/DL
MCV RBC AUTO: 85.1 FL
MONOCYTES # BLD AUTO: 0.51 K/UL
MONOCYTES NFR BLD AUTO: 8.5 %
NASH SCORING: NORMAL
NECROINFLAMMATORY ACT GRADE SERPL QL: NORMAL
NECROINFLAMMATORY ACT SCORE SERPL: 0.49
NEUTROPHILS # BLD AUTO: 3.38 K/UL
NEUTROPHILS NFR BLD AUTO: 56.2 %
NITRITE URINE: NEGATIVE
NONHDLC SERPL-MCNC: 111 MG/DL
PH URINE: 6.5
PLATELET # BLD AUTO: 218 K/UL
POTASSIUM SERPL-SCNC: 4 MMOL/L
PROT SERPL-MCNC: 7.1 G/DL
PROTEIN URINE: 30 MG/DL
PSA FREE FLD-MCNC: 28 %
PSA FREE SERPL-MCNC: 0.45 NG/ML
PSA SERPL-MCNC: 1.62 NG/ML
RBC # BLD: 5.03 M/UL
RBC # FLD: 13.5 %
SERVICE CMNT-IMP: NORMAL
SODIUM SERPL-SCNC: 142 MMOL/L
SPECIFIC GRAVITY URINE: 1.02
STEATOSIS GRADE: NORMAL
STEATOSIS SCORE: 0.61
STEATOSIS SCORING: NORMAL
TRIGL SERPL-MCNC: 116 MG/DL
TRIGL SERPL-MCNC: 121 MG/DL
TSH SERPL-ACNC: 1.94 UIU/ML
UROBILINOGEN URINE: 1 MG/DL
VIT B12 SERPL-MCNC: 879 PG/ML
WBC # FLD AUTO: 6.02 K/UL

## 2024-02-01 ENCOUNTER — APPOINTMENT (OUTPATIENT)
Dept: ORTHOPEDIC SURGERY | Facility: CLINIC | Age: 59
End: 2024-02-01
Payer: COMMERCIAL

## 2024-02-01 VITALS — BODY MASS INDEX: 30.81 KG/M2 | HEIGHT: 69 IN | WEIGHT: 208 LBS

## 2024-02-01 DIAGNOSIS — M25.569 PAIN IN UNSPECIFIED KNEE: ICD-10-CM

## 2024-02-01 PROCEDURE — 99213 OFFICE O/P EST LOW 20 MIN: CPT | Mod: 25

## 2024-02-01 PROCEDURE — 20610 DRAIN/INJ JOINT/BURSA W/O US: CPT | Mod: LT

## 2024-02-01 NOTE — IMAGING
[de-identified] : Constitutional: well developed and well nourished, able to communicate Cardiovascular: Peripheral vascular exam is grossly normal Neurologic: Alert and oriented, no acute distress. Skin: normal skin with no ulcers, rashes, or lesions Pulmonary: No respiratory distress, breathing comfortably on room air Lymphatics: No obvious lymphadenopathy or lymphedema in areas examined  LEFT KNEE EXAM Alignment: Neutral  Effusion: mild Atrophy: None                                                  Stable to Varus/valgus stress Posterior Drawer Test: negative Anterior Drawer Test: Negative Knee Extension/Flexion: 0 / 120  Medial/lateral compartments Medial joint line: POS Tenderness Lateral joint line: No Tenderness Ck test: negative  Patellofemoral joint Medial patellar facet: no tenderness Patellar grind: Negative  Tendons: Pes Anserine: No tenderness Gerdys Tubercle/ IT Band: No tenderness Quadriceps Tendon: No Tenderness patellar tendon: no Tenderness Tibial tubercle: not tenderness Calf: no Tenderness  Neurovascular exam Muscle strength: 5/5 Sensation to light touch: intact Distal pulses: 2+  IMAGING:  Xrays of the Left Knee were taken demonstrating  MRI fissuring medial surface patella - (ZWANGER)  chodnral defect trochlea high grade defect medial femoral condyle medial mensicus intact

## 2024-02-01 NOTE — ASSESSMENT
[FreeTextEntry1] : 58 year M with left knee OA - MRI - cesia CSI of the left knee today.   23: CSI L knee Follow up three months  2023 continues to have swelling of the knee, pain is improved educated on elevation and compression 6 week follow up for repeat CSI  2024 CSI of the left knee today 3 month fu PRN  Time Based billin minutes was spent with the patient today taking the patient's history, conducting a physical examination, reviewing imaging studies, and  detailed discussion regarding the diagnosis and treatment plan.

## 2024-02-01 NOTE — HISTORY OF PRESENT ILLNESS
[8] : 8 [Dull/Aching] : dull/aching [Sharp] : sharp [Frequent] : frequent [Leisure] : leisure [Rest] : rest [Walking] : walking [de-identified] : 08/24/2023 Mr. TYLER GOLDSTEIN is a 58-year male that comes in today with a chief complaint of David knee.  + injection of the left knee. CSI 3 months prior.  + diclofenac   11/9/23: Here for follow up. States 11 weeks of relief with CSI  12/21/23: patient still endorses swelling of the knee. Pain 3-4/10  02/01/2024 pain level around 3. CSI did help out.  [] : Post Surgical Visit: no [FreeTextEntry1] : bilateral knees  [de-identified] : xray/mri

## 2024-02-15 ENCOUNTER — APPOINTMENT (OUTPATIENT)
Dept: ORTHOPEDIC SURGERY | Facility: CLINIC | Age: 59
End: 2024-02-15

## 2024-03-01 ENCOUNTER — APPOINTMENT (OUTPATIENT)
Dept: ORTHOPEDIC SURGERY | Facility: CLINIC | Age: 59
End: 2024-03-01
Payer: COMMERCIAL

## 2024-03-01 VITALS — HEIGHT: 69 IN | WEIGHT: 208 LBS | BODY MASS INDEX: 30.81 KG/M2

## 2024-03-01 DIAGNOSIS — M75.110 INCOMPLETE ROTATOR CUFF TEAR OR RUPTURE OF UNSPECIFIED SHOULDER, NOT SPECIFIED AS TRAUMATIC: ICD-10-CM

## 2024-03-01 DIAGNOSIS — E78.00 PURE HYPERCHOLESTEROLEMIA, UNSPECIFIED: ICD-10-CM

## 2024-03-01 DIAGNOSIS — M75.102 UNSPECIFIED ROTATOR CUFF TEAR OR RUPTURE OF LEFT SHOULDER, NOT SPECIFIED AS TRAUMATIC: ICD-10-CM

## 2024-03-01 DIAGNOSIS — M75.42 IMPINGEMENT SYNDROME OF LEFT SHOULDER: ICD-10-CM

## 2024-03-01 DIAGNOSIS — M19.012 PRIMARY OSTEOARTHRITIS, LEFT SHOULDER: ICD-10-CM

## 2024-03-01 PROCEDURE — 99204 OFFICE O/P NEW MOD 45 MIN: CPT | Mod: 25

## 2024-03-01 PROCEDURE — 73050 X-RAY EXAM OF SHOULDERS: CPT | Mod: LT

## 2024-03-01 PROCEDURE — 99214 OFFICE O/P EST MOD 30 MIN: CPT | Mod: 25

## 2024-03-01 PROCEDURE — 73030 X-RAY EXAM OF SHOULDER: CPT | Mod: LT

## 2024-03-01 PROCEDURE — 20610 DRAIN/INJ JOINT/BURSA W/O US: CPT | Mod: LT

## 2024-03-01 PROCEDURE — J3490M: CUSTOM

## 2024-03-01 RX ORDER — MELOXICAM 15 MG/1
15 TABLET ORAL
Qty: 30 | Refills: 1 | Status: ACTIVE | COMMUNITY
Start: 2024-03-01 | End: 1900-01-01

## 2024-03-01 NOTE — IMAGING
[de-identified] : The patient is a well appearing 58 year year old male of their stated age. Neck is supple & nontender to palpation. Negative Spurling's test.  Effected Shoulder LEFT Inspection: Scapula Winging: Negative Deformity: None Erythema: None Ecchymosis: None Abrasions: None Effusion: Mild Crepitus: Moderate  Range of Motion: Active Forward Flexion:110 degrees  Passive Forward Flexion:130 degrees  Active IR : back pocket Active ER :30 degrees  Motor Exam: Forward Flexion: 4+ out of 5 Flexion Plane of Scapula: 5 out of 5 Abduction: 4+ out of 5 Internal Rotation: 5 out of 5 External Rotation: 4+ out of 5 Distal Motor Strength: 5 out of 5  Stability Testing: Anterior: 1+ Posterior: 1+ Sulcus N: 1+ Sulcus ER: 1+  Provocative Tests: Drop Arm: Negative Minor/Impingement: Positive [place]: Positive X-Arm Adduction: Negative Belly Press: Negative Bear Hug: Negative Lift Off: Negative Apprehension: Negative Relocation: Negative Posterior Load & Shift: Negative  Palpation: AC Joint: Nontender Clavicle: Nontender SC Joint: Nontender Bicepital Groove: Positive Coracoid Process: Positive Pectoralis Minor Tendon: Nontender Pectoralis Major Tendon: Nontender & palpably intact Latissimus Dorsi: Nontender  Proximal Humerus: Positive Scapula Body: Nontender Medial Scapula Boarder: Nontender Scapula Spine: Nontender  Neurologic Exam: Sensation to Light Touch: Axillary: Grossly intact Ulnar: Grossly intact Radial: Grossly intact Median: Grossly intact Other:  N/A  Circulatory/Pulses: Ulnar: 2+ Radial: 2+ Other Pertinent Findings: None  Contralateral Shoulder Range of Motion: Active Forward Flexion: 180 degrees  Active Abduction: 180 degrees  Passive Forward Flexion: 180 degrees  Passive Abduction: 180 degrees  ER @ 90 degrees: 90 degrees IR @ 90 degrees: 45 degrees ER @ 0 degrees: 50 degrees  Motor Exam: Forward Flexion: 5 out of 5 Flexion Plane of Scapula: 5 out of 5 Abduction: 5 out of 5 Internal Rotation: 5 out of 5 External Rotation: 5 out of 5 Distal Motor Strength: 5 out of 5  Stability Testing: Anterior: 1+ Posterior: 1+ Sulcus N: 1+ Sulcus ER: 1+  Other Pertinent Findings: None

## 2024-03-01 NOTE — PROCEDURE
[FreeTextEntry3] : Patient Identification  Name/: Verbal with patient and/or family    Procedure Verification:  Procedure confirmed with patient or family/designee  Consent for procedure: Verbal Consent Given  Relevant documentation completed, reviewed, and signed  Clinical indications for procedure confirmed    Time-out with all members of procedure team immediately prior to procedure:  Correct patient identified. Agreement on procedure. Correct side and site.    W/O ULTRASOUND GUIDED SHOULDER SUBACROMIAL INJECTION - LEFT  After verbal consent and identification of the correct patient and correct site, the posterior right shoulder was prepped using alcohol swabs and betadine. This was allowed time to air dry. After ethyl chloride spray for skin anesthesia, a mixture of 1cc Celestone 6mg/ml, 3cc Lidocaine 1%, and 3cc Bupivacaine 0.5% was injected under ultrasound guidance into the subacromial space from posterior using a sterile 22G needle. Visualization of the needle and placement of the injection was performed without any complications. The patient tolerated the procedure well. After-care instructions were provided and included instructions to ice the area and to call if redness, pain, or fever develop.

## 2024-03-01 NOTE — DISCUSSION/SUMMARY
[de-identified] : Assessment:   The patient presents with history, examination and imaging that are most consistent with a diagnosis of:  Left shoulder arthritis   The patient would like to pursue conservative measures at this time. After consideration of various non-operative treatment modalities, the patient would like to proceed with the modalities listed below   During this appointment the patient was examined, diagnoses were discussed and explained in a face to face manner. In addition extensive time was spent reviewing aforementioned diagnostic studies. Counseling including abnormal image results, differential diagnoses, treatment options, risk and benefits, lifestyle changes, current condition, and current medications was performed. Patient's comments, questions, and concerns were address and patient verbalized understanding.   ---------------------------     Plan: - CSI left shoulder today - darryl well - Meloxicam 15mg rx sent to pharmacy - Start PT, script provided     Follow-up:  6 weeks

## 2024-03-01 NOTE — HISTORY OF PRESENT ILLNESS
[Dull/Aching] : dull/aching [Throbbing] : throbbing [Sleep] : sleep [Leisure] : leisure [Work] : work [Social interactions] : social interactions [Full time] : Work status: full time [de-identified] : 3/1/24: CONSULT OF THE LT SHOULDER FROM DR LOJA. PAIN FOR ABOUT 3 MONTHS NOW. NKI. AC JOINT AREA  [FreeTextEntry1] : LT SHOULDER [] : no [FreeTextEntry9] : N/A

## 2024-03-28 ENCOUNTER — APPOINTMENT (OUTPATIENT)
Dept: ORTHOPEDIC SURGERY | Facility: CLINIC | Age: 59
End: 2024-03-28
Payer: COMMERCIAL

## 2024-03-28 VITALS — BODY MASS INDEX: 30.81 KG/M2 | WEIGHT: 208 LBS | HEIGHT: 69 IN

## 2024-03-28 PROCEDURE — 99213 OFFICE O/P EST LOW 20 MIN: CPT | Mod: 25

## 2024-03-28 PROCEDURE — 20610 DRAIN/INJ JOINT/BURSA W/O US: CPT | Mod: LT

## 2024-03-28 RX ORDER — METHYLPREDNISOLONE 4 MG/1
4 TABLET ORAL
Qty: 1 | Refills: 1 | Status: ACTIVE | COMMUNITY
Start: 2024-03-28 | End: 1900-01-01

## 2024-03-28 NOTE — IMAGING
[de-identified] : Constitutional: well developed and well nourished, able to communicate Cardiovascular: Peripheral vascular exam is grossly normal Neurologic: Alert and oriented, no acute distress. Skin: normal skin with no ulcers, rashes, or lesions Pulmonary: No respiratory distress, breathing comfortably on room air Lymphatics: No obvious lymphadenopathy or lymphedema in areas examined  LEFT KNEE EXAM Alignment: Neutral  Effusion: mild Atrophy: None                                                  Stable to Varus/valgus stress Posterior Drawer Test: negative Anterior Drawer Test: Negative Knee Extension/Flexion: 0 / 120  Medial/lateral compartments Medial joint line: POS Tenderness Lateral joint line: No Tenderness Ck test: negative  Patellofemoral joint Medial patellar facet: no tenderness Patellar grind: Negative  Tendons: Pes Anserine: No tenderness Gerdys Tubercle/ IT Band: No tenderness Quadriceps Tendon: No Tenderness patellar tendon: no Tenderness Tibial tubercle: not tenderness Calf: no Tenderness  Neurovascular exam Muscle strength: 5/5 Sensation to light touch: intact Distal pulses: 2+  IMAGING:  Xrays of the Left Knee were taken demonstrating  MRI fissuring medial surface patella - (ZWANGER)  chodnral defect trochlea high grade defect medial femoral condyle medial mensicus intact

## 2024-03-28 NOTE — HISTORY OF PRESENT ILLNESS
[de-identified] : 08/24/2023 Mr. TYLER GOLDSTEIN is a 58-year male that comes in today with a chief complaint of David knee.  + injection of the left knee. CSI 3 months prior.  + diclofenac   11/9/23: Here for follow up. States 11 weeks of relief with CSI  12/21/23: patient still endorses swelling of the knee. Pain 3-4/10  02/01/2024 pain level around 3. CSI did help out.  [] : Post Surgical Visit: no [FreeTextEntry1] : bilateral knees  [de-identified] : xray/mri

## 2024-03-28 NOTE — ASSESSMENT
[FreeTextEntry1] : 58 year M with left knee OA - MRI - [facility] CSI of the left knee today.   23: CSI L knee Follow up three months  2023 continues to have swelling of the knee, pain is improved educated on elevation and compression 6 week follow up for repeat CSI  2024 CSI of the left knee today 3 month fu PRN  2024  MDP sent to pharmacy aspiration of the left knee today, 45 cc removed  Time Based billin minutes was spent with the patient today taking the patient's history, conducting a physical examination, reviewing imaging studies, and  detailed discussion regarding the diagnosis and treatment plan.

## 2024-03-28 NOTE — PROCEDURE
[FreeTextEntry3] : The risks, benefits and alternatives to the injection were discussed with the patient. The decision was made to proceed with the injection to reduce inflammation within the area. Verbal consent was obtained for the procedure. The left knee was cleaned with alcohol and ethyl chloride was sprayed topically. 4cc of 1% lidocaine was injected and 45cc removed straw colored fluid. The patient tolerated the procedure well and was instructed to ice the affected joint as need+-ed later today. Activities can be performed as tolerated

## 2024-04-11 RX ORDER — MELOXICAM 15 MG/1
15 TABLET ORAL
Qty: 30 | Refills: 0 | Status: ACTIVE | COMMUNITY
Start: 2024-04-11 | End: 1900-01-01

## 2024-04-18 ENCOUNTER — APPOINTMENT (OUTPATIENT)
Dept: ORTHOPEDIC SURGERY | Facility: CLINIC | Age: 59
End: 2024-04-18

## 2024-04-25 ENCOUNTER — APPOINTMENT (OUTPATIENT)
Dept: ORTHOPEDIC SURGERY | Facility: CLINIC | Age: 59
End: 2024-04-25
Payer: SELF-PAY

## 2024-04-25 DIAGNOSIS — M17.12 UNILATERAL PRIMARY OSTEOARTHRITIS, LEFT KNEE: ICD-10-CM

## 2024-04-25 PROCEDURE — 20610 DRAIN/INJ JOINT/BURSA W/O US: CPT | Mod: LT

## 2024-04-25 PROCEDURE — 99213 OFFICE O/P EST LOW 20 MIN: CPT | Mod: 25

## 2024-04-25 NOTE — IMAGING
[de-identified] : Constitutional: well developed and well nourished, able to communicate Cardiovascular: Peripheral vascular exam is grossly normal Neurologic: Alert and oriented, no acute distress. Skin: normal skin with no ulcers, rashes, or lesions Pulmonary: No respiratory distress, breathing comfortably on room air Lymphatics: No obvious lymphadenopathy or lymphedema in areas examined  LEFT KNEE EXAM Alignment: Neutral  Effusion: mild Atrophy: None                                                  Stable to Varus/valgus stress Posterior Drawer Test: negative Anterior Drawer Test: Negative Knee Extension/Flexion: 0 / 120  Medial/lateral compartments Medial joint line: POS Tenderness Lateral joint line: No Tenderness Ck test: negative  Patellofemoral joint Medial patellar facet: no tenderness Patellar grind: Negative  Tendons: Pes Anserine: No tenderness Gerdys Tubercle/ IT Band: No tenderness Quadriceps Tendon: No Tenderness patellar tendon: no Tenderness Tibial tubercle: not tenderness Calf: no Tenderness  Neurovascular exam Muscle strength: 5/5 Sensation to light touch: intact Distal pulses: 2+  IMAGING:  Xrays of the Left Knee were taken demonstrating  MRI fissuring medial surface patella - (ZWANGER)  chodnral defect trochlea high grade defect medial femoral condyle medial mensicus intact

## 2024-04-25 NOTE — HISTORY OF PRESENT ILLNESS
[8] : 8 [7] : 7 [Dull/Aching] : dull/aching [Sharp] : sharp [Frequent] : frequent [Leisure] : leisure [Rest] : rest [Walking] : walking [de-identified] : 08/24/2023 Mr. TYLER GOLDSTEIN is a 58-year male that comes in today with a chief complaint of David knee.  + injection of the left knee. CSI 3 months prior.  + diclofenac   11/9/23: Here for follow up. States 11 weeks of relief with CSI  12/21/23: patient still endorses swelling of the knee. Pain 3-4/10  02/01/2024 pain level around 3. CSI did help out.   04/25/24- pt is here for a follow up on LT knee pain, mild relief with CSI of the left knee [] : Post Surgical Visit: no [FreeTextEntry1] : LT knees  [FreeTextEntry5] : pt states pain returned unbearable. also states having problem walking / stiffness and a clicking noise.  [de-identified] : xray/mri

## 2024-04-25 NOTE — ASSESSMENT
[FreeTextEntry1] : 58 year M with left knee OA - MRI - [facility] CSI of the left knee today.   11/9/23: CSI L knee Follow up three months  12/21/2023 continues to have swelling of the knee, pain is improved educated on elevation and compression 6 week follow up for repeat CSI  02/01/2024 CSI of the left knee today 3 month fu PRN  03/28/2024  MDP sent to pharmacy aspiration of the left knee today, 45 cc removed  04/25/2024 CSI of the left knee today.  consider referall to sports in the future if continues to have minimal relief with CSI

## 2024-05-03 ENCOUNTER — APPOINTMENT (OUTPATIENT)
Dept: ORTHOPEDIC SURGERY | Facility: CLINIC | Age: 59
End: 2024-05-03

## 2024-07-10 ENCOUNTER — RX RENEWAL (OUTPATIENT)
Age: 59
End: 2024-07-10

## 2024-08-08 ENCOUNTER — APPOINTMENT (OUTPATIENT)
Dept: ORTHOPEDIC SURGERY | Facility: CLINIC | Age: 59
End: 2024-08-08

## 2024-11-18 ENCOUNTER — RX RENEWAL (OUTPATIENT)
Age: 59
End: 2024-11-18